# Patient Record
Sex: FEMALE | Race: WHITE | Employment: FULL TIME | URBAN - METROPOLITAN AREA
[De-identification: names, ages, dates, MRNs, and addresses within clinical notes are randomized per-mention and may not be internally consistent; named-entity substitution may affect disease eponyms.]

---

## 2020-10-06 ENCOUNTER — HOSPITAL ENCOUNTER (OUTPATIENT)
Age: 30
Discharge: ANOTHER HOSPITAL | End: 2020-10-06
Payer: COMMERCIAL

## 2020-10-06 VITALS
HEART RATE: 90 BPM | TEMPERATURE: 98 F | SYSTOLIC BLOOD PRESSURE: 111 MMHG | WEIGHT: 187 LBS | DIASTOLIC BLOOD PRESSURE: 82 MMHG | BODY MASS INDEX: 31.92 KG/M2 | RESPIRATION RATE: 20 BRPM | HEIGHT: 64 IN | OXYGEN SATURATION: 99 %

## 2020-10-06 DIAGNOSIS — R51.9 ACUTE INTRACTABLE HEADACHE, UNSPECIFIED HEADACHE TYPE: Primary | ICD-10-CM

## 2020-10-06 PROCEDURE — 99202 OFFICE O/P NEW SF 15 MIN: CPT | Performed by: NURSE PRACTITIONER

## 2020-10-06 RX ORDER — ALPRAZOLAM 0.25 MG/1
TABLET ORAL
COMMUNITY
Start: 2020-01-29

## 2020-10-06 NOTE — ED PROVIDER NOTES
Patient presents with:  Headache      HPI:     Laureano Washburn is a 35year old female presents with a chief complaint of a headache that started 3 days ago.   The patient states she does get headaches during her menstrual cycle, but this is not during he file        Gets together: Not on file        Attends Yarsanism service: Not on file        Active member of club or organization: Not on file        Attends meetings of clubs or organizations: Not on file        Relationship status: Not on file      Intim eye, and no history of migraine headaches in the past, I will send her to the emergency department for further evaluation. She agrees with the plan of care. Her family friend will drive her to the Emergency Department at [facility].     I discussed [facility]

## 2020-10-06 NOTE — ED INITIAL ASSESSMENT (HPI)
Per pt having headache/ migraine for 3 days reports right eye blurred vision. Denies any nausea or vomiting.  Minimal relief with over the counter medications

## 2020-10-06 NOTE — ED NOTES
Per provider recommendation pt will go to [facility] for further evaluation of symptoms.  Pt leaving IC stable no acute distress noted

## 2020-11-23 ENCOUNTER — HOSPITAL ENCOUNTER (OUTPATIENT)
Age: 30
Discharge: HOME | End: 2020-11-23
Payer: COMMERCIAL

## 2020-11-23 VITALS
DIASTOLIC BLOOD PRESSURE: 70 MMHG | HEART RATE: 94 BPM | OXYGEN SATURATION: 99 % | TEMPERATURE: 98 F | RESPIRATION RATE: 20 BRPM | SYSTOLIC BLOOD PRESSURE: 117 MMHG

## 2020-11-23 DIAGNOSIS — Z20.822 ENCOUNTER FOR SCREENING LABORATORY TESTING FOR COVID-19 VIRUS IN ASYMPTOMATIC PATIENT: Primary | ICD-10-CM

## 2020-11-23 PROCEDURE — 99213 OFFICE O/P EST LOW 20 MIN: CPT | Performed by: NURSE PRACTITIONER

## 2020-11-23 NOTE — ED PROVIDER NOTES
Patient Seen in: [facility]      History   Patient presents with:  Testing    Stated Complaint: COVID TESTING    HPI    This is a 28-year-old female presenting for Covid testing, asymptomatic.   Patient with no complaints, exposed by previous 2 seconds. Neurological:      General: No focal deficit present. Mental Status: She is alert and oriented to person, place, and time.    Psychiatric:         Mood and Affect: Mood normal.             ED Course     Labs Reviewed   2019 CHELSY [address]

## 2021-06-04 ENCOUNTER — APPOINTMENT (OUTPATIENT)
Dept: GENERAL RADIOLOGY | Age: 31
End: 2021-06-04
Attending: NURSE PRACTITIONER
Payer: COMMERCIAL

## 2021-06-04 ENCOUNTER — HOSPITAL ENCOUNTER (OUTPATIENT)
Age: 31
Discharge: HOME | End: 2021-06-04
Payer: COMMERCIAL

## 2021-06-04 VITALS
TEMPERATURE: 98 F | RESPIRATION RATE: 21 BRPM | OXYGEN SATURATION: 100 % | HEART RATE: 80 BPM | SYSTOLIC BLOOD PRESSURE: 121 MMHG | DIASTOLIC BLOOD PRESSURE: 63 MMHG

## 2021-06-04 DIAGNOSIS — K59.00 CONSTIPATION, UNSPECIFIED CONSTIPATION TYPE: Primary | ICD-10-CM

## 2021-06-04 PROCEDURE — 99214 OFFICE O/P EST MOD 30 MIN: CPT | Performed by: NURSE PRACTITIONER

## 2021-06-04 PROCEDURE — 81025 URINE PREGNANCY TEST: CPT | Performed by: NURSE PRACTITIONER

## 2021-06-04 PROCEDURE — 74018 RADEX ABDOMEN 1 VIEW: CPT | Performed by: NURSE PRACTITIONER

## 2021-06-04 NOTE — ED INITIAL ASSESSMENT (HPI)
Pt here for c/o being constipated. Pt states \"I haven't pooped in 2 weeks. \" Pt had tried Miralax and laxative and has only had a very small bowel movement the size of a marcia kiss. Pt has slight nausea and denies vomiting.  Pt has low abdominal and back

## 2021-06-05 NOTE — ED PROVIDER NOTES
Patient Seen in: [facility]      History   Patient presents with:  Constipation    Stated Complaint:     HPI/Subjective:   HPI    This well-appearing 67-year-old female who presents with a chief complaint of constipation.   Patient states she canal and external ear normal.      Left Ear: Tympanic membrane, ear canal and external ear normal.      Nose: Nose normal.      Mouth/Throat:      Mouth: Mucous membranes are moist.      Pharynx: Oropharynx is clear. Uvula midline.    Eyes:      General: L movement. I have given GI follow-up. I do not believe any emergent imaging or evaluation would be warranted at this time. Patient not tachycardic, not afebrile, not vomiting. Both patient and mom agreed with the plan of care and states understanding.  D

## 2021-07-29 NOTE — H&P
[address] [facility] [address] Gastroenterology                                                                                                  Clinic History and Physical     Pa used    Alcohol use: Never    Drug use: Not on file       Medications (Active prior to today's visit):  Current Outpatient Medications   Medication Sig Dispense Refill   • DULoxetine 30 MG Oral Cap DR Particles Take 30 mg by mouth daily.      • EPINEPHrine tenderness  Skin: dry, warm, no jaundice  Ext: no cyanosis, clubbing or edema is evident.    Neuro: Alert and oriented x4, and patient is having movements of all 4 extremities   Psych: Pt has a normal mood and affect, behavior is normal    Nursing note and

## 2021-08-12 ENCOUNTER — OFFICE VISIT (OUTPATIENT)
Dept: GASTROENTEROLOGY | Facility: CLINIC | Age: 31
End: 2021-08-12
Payer: COMMERCIAL

## 2021-08-12 VITALS
SYSTOLIC BLOOD PRESSURE: 126 MMHG | BODY MASS INDEX: 33.63 KG/M2 | HEIGHT: 64 IN | WEIGHT: 197 LBS | HEART RATE: 91 BPM | DIASTOLIC BLOOD PRESSURE: 83 MMHG

## 2021-08-12 DIAGNOSIS — K59.00 CONSTIPATION, UNSPECIFIED CONSTIPATION TYPE: Primary | ICD-10-CM

## 2021-08-12 PROCEDURE — 3008F BODY MASS INDEX DOCD: CPT | Performed by: NURSE PRACTITIONER

## 2021-08-12 PROCEDURE — 3079F DIAST BP 80-89 MM HG: CPT | Performed by: NURSE PRACTITIONER

## 2021-08-12 PROCEDURE — 3074F SYST BP LT 130 MM HG: CPT | Performed by: NURSE PRACTITIONER

## 2021-08-12 PROCEDURE — 99203 OFFICE O/P NEW LOW 30 MIN: CPT | Performed by: NURSE PRACTITIONER

## 2021-08-12 RX ORDER — DULOXETIN HYDROCHLORIDE 30 MG/1
30 CAPSULE, DELAYED RELEASE ORAL DAILY
COMMUNITY
Start: 2020-05-05

## 2021-08-12 RX ORDER — POLYETHYLENE GLYCOL 3350, SODIUM CHLORIDE, SODIUM BICARBONATE, POTASSIUM CHLORIDE 420; 11.2; 5.72; 1.48 G/4L; G/4L; G/4L; G/4L
POWDER, FOR SOLUTION ORAL
Qty: 4000 ML | Refills: 0 | Status: SHIPPED | OUTPATIENT
Start: 2021-08-12

## 2021-08-12 RX ORDER — EPINEPHRINE 0.3 MG/.3ML
INJECTION SUBCUTANEOUS
COMMUNITY

## 2021-08-12 RX ORDER — NORGESTIMATE AND ETHINYL ESTRADIOL 0.25-0.035
1 KIT ORAL DAILY
COMMUNITY
Start: 2020-05-06

## 2021-08-12 NOTE — PATIENT INSTRUCTIONS
I discussed with the patient that based on his/her clinical symptoms, exam and/or imaging, there is evidence of significant stool burden.  While laxative treatment will be helpful as a maintenance therapy, clearing the stool burden may help alleviate the sy

## 2021-12-06 ENCOUNTER — HOSPITAL ENCOUNTER (OUTPATIENT)
Age: 31
Discharge: HOME | End: 2021-12-06
Payer: COMMERCIAL

## 2021-12-06 VITALS
TEMPERATURE: 98 F | HEART RATE: 74 BPM | RESPIRATION RATE: 18 BRPM | DIASTOLIC BLOOD PRESSURE: 67 MMHG | OXYGEN SATURATION: 100 % | SYSTOLIC BLOOD PRESSURE: 122 MMHG

## 2021-12-06 DIAGNOSIS — Z11.52 ENCOUNTER FOR SCREENING FOR COVID-19: Primary | ICD-10-CM

## 2021-12-06 DIAGNOSIS — Z20.822 LAB TEST NEGATIVE FOR COVID-19 VIRUS: ICD-10-CM

## 2021-12-06 PROCEDURE — U0002 COVID-19 LAB TEST NON-CDC: HCPCS | Performed by: NURSE PRACTITIONER

## 2021-12-06 PROCEDURE — 99212 OFFICE O/P EST SF 10 MIN: CPT | Performed by: NURSE PRACTITIONER

## 2021-12-07 NOTE — ED PROVIDER NOTES
Patient Seen in: [facility]      History   Patient presents with:  Testing    Stated Complaint: Covid Test    Subjective:   Well-appearing 27-year-old female presents for COVID-19 testing for traveling. Patient denies any symptoms.   Patient abnormality. Memory normal.        ED Course     Labs Reviewed   RAPID SARS-COV-2 BY PCR - Normal                MDM   Patient is well-appearing. No acute distress. Rapid COVID-19 PCR not detected.   I discussed close PMD follow-up as well as return/ED pr

## 2022-05-02 ENCOUNTER — OFFICE VISIT (OUTPATIENT)
Dept: FAMILY MEDICINE CLINIC | Facility: CLINIC | Age: 32
End: 2022-05-02
Payer: COMMERCIAL

## 2022-05-02 VITALS
RESPIRATION RATE: 19 BRPM | WEIGHT: 204 LBS | DIASTOLIC BLOOD PRESSURE: 86 MMHG | BODY MASS INDEX: 34.83 KG/M2 | HEIGHT: 64 IN | SYSTOLIC BLOOD PRESSURE: 137 MMHG | OXYGEN SATURATION: 98 % | HEART RATE: 76 BPM

## 2022-05-02 DIAGNOSIS — F41.9 ANXIETY: ICD-10-CM

## 2022-05-02 DIAGNOSIS — K59.09 CHRONIC CONSTIPATION: Primary | ICD-10-CM

## 2022-05-02 DIAGNOSIS — R53.83 FATIGUE, UNSPECIFIED TYPE: ICD-10-CM

## 2022-05-02 DIAGNOSIS — R68.89 COLD INTOLERANCE: ICD-10-CM

## 2022-05-02 LAB
BASOPHILS # BLD AUTO: 0.03 X10(3) UL (ref 0–0.2)
BASOPHILS NFR BLD AUTO: 0.4 %
DEPRECATED RDW RBC AUTO: 40.4 FL (ref 35.1–46.3)
EOSINOPHIL # BLD AUTO: 0.23 X10(3) UL (ref 0–0.7)
EOSINOPHIL NFR BLD AUTO: 3.3 %
ERYTHROCYTE [DISTWIDTH] IN BLOOD BY AUTOMATED COUNT: 12.1 % (ref 11–15)
HCT VFR BLD AUTO: 38.8 %
HGB BLD-MCNC: 12.8 G/DL
IMM GRANULOCYTES # BLD AUTO: 0.01 X10(3) UL (ref 0–1)
IMM GRANULOCYTES NFR BLD: 0.1 %
LYMPHOCYTES # BLD AUTO: 2.84 X10(3) UL (ref 1–4)
LYMPHOCYTES NFR BLD AUTO: 40.6 %
MCH RBC QN AUTO: 30.3 PG (ref 26–34)
MCHC RBC AUTO-ENTMCNC: 33 G/DL (ref 31–37)
MCV RBC AUTO: 91.9 FL
MONOCYTES # BLD AUTO: 0.67 X10(3) UL (ref 0.1–1)
MONOCYTES NFR BLD AUTO: 9.6 %
NEUTROPHILS # BLD AUTO: 3.22 X10 (3) UL (ref 1.5–7.7)
NEUTROPHILS # BLD AUTO: 3.22 X10(3) UL (ref 1.5–7.7)
NEUTROPHILS NFR BLD AUTO: 46 %
PLATELET # BLD AUTO: 283 10(3)UL (ref 150–450)
RBC # BLD AUTO: 4.22 X10(6)UL
TSI SER-ACNC: 1.76 MIU/ML (ref 0.36–3.74)
WBC # BLD AUTO: 7 X10(3) UL (ref 4–11)

## 2022-05-02 PROCEDURE — 99203 OFFICE O/P NEW LOW 30 MIN: CPT | Performed by: FAMILY MEDICINE

## 2022-05-02 PROCEDURE — 3075F SYST BP GE 130 - 139MM HG: CPT | Performed by: FAMILY MEDICINE

## 2022-05-02 PROCEDURE — 3079F DIAST BP 80-89 MM HG: CPT | Performed by: FAMILY MEDICINE

## 2022-05-02 PROCEDURE — 3008F BODY MASS INDEX DOCD: CPT | Performed by: FAMILY MEDICINE

## 2022-05-09 ENCOUNTER — TELEPHONE (OUTPATIENT)
Dept: FAMILY MEDICINE CLINIC | Facility: CLINIC | Age: 32
End: 2022-05-09

## 2022-05-09 NOTE — TELEPHONE ENCOUNTER
Patients boyfriend came into the office, we were able to get a sooner appt. With [facility] provider Sing on 5/17/22, but patient is in chronic pain due to constipation. Would like a call from nurse or Dr. Krystal Mar to see if there is something to help her or can prescribe until she can see the gastro doctor next week.   Pls call at     pls advise

## 2022-05-09 NOTE — TELEPHONE ENCOUNTER
Upon chart review the patient recently saw Dr. Cyn Lopez regarding her constipation. Future Appointments   Date Time Provider Tamara Lata   5/17/2022 10:00 AM Michael Hayes MD [address] [facility]     Please advise.

## 2022-05-09 NOTE — TELEPHONE ENCOUNTER
5/17 is soonest available with all  GI providers. Patient sent [facility] message stating she is satisfied with that appointment.

## 2022-05-17 ENCOUNTER — TELEPHONE (OUTPATIENT)
Dept: GASTROENTEROLOGY | Facility: CLINIC | Age: 32
End: 2022-05-17

## 2022-05-17 ENCOUNTER — OFFICE VISIT (OUTPATIENT)
Dept: GASTROENTEROLOGY | Facility: CLINIC | Age: 32
End: 2022-05-17
Payer: COMMERCIAL

## 2022-05-17 VITALS
BODY MASS INDEX: 34.66 KG/M2 | WEIGHT: 203 LBS | HEIGHT: 64 IN | SYSTOLIC BLOOD PRESSURE: 112 MMHG | HEART RATE: 89 BPM | DIASTOLIC BLOOD PRESSURE: 74 MMHG

## 2022-05-17 DIAGNOSIS — R14.0 ABDOMINAL BLOATING: ICD-10-CM

## 2022-05-17 DIAGNOSIS — K59.00 CONSTIPATION, UNSPECIFIED CONSTIPATION TYPE: ICD-10-CM

## 2022-05-17 DIAGNOSIS — R19.4 FREQUENT BOWEL MOVEMENTS: Primary | ICD-10-CM

## 2022-05-17 DIAGNOSIS — R19.4 CHANGE IN BOWEL HABITS: Primary | ICD-10-CM

## 2022-05-17 PROCEDURE — 3074F SYST BP LT 130 MM HG: CPT | Performed by: INTERNAL MEDICINE

## 2022-05-17 PROCEDURE — 3008F BODY MASS INDEX DOCD: CPT | Performed by: INTERNAL MEDICINE

## 2022-05-17 PROCEDURE — 99214 OFFICE O/P EST MOD 30 MIN: CPT | Performed by: INTERNAL MEDICINE

## 2022-05-17 PROCEDURE — 3078F DIAST BP <80 MM HG: CPT | Performed by: INTERNAL MEDICINE

## 2022-05-17 RX ORDER — MAGNESIUM CARB/ALUMINUM HYDROX 105-160MG
296 TABLET,CHEWABLE ORAL ONCE
Status: SHIPPED | OUTPATIENT
Start: 2022-05-17

## 2022-05-17 RX ORDER — NORGESTIMATE AND ETHINYL ESTRADIOL 0.25-0.035
KIT ORAL
COMMUNITY

## 2022-05-17 RX ORDER — LINACLOTIDE 145 UG/1
145 CAPSULE, GELATIN COATED ORAL DAILY
Qty: 90 CAPSULE | Refills: 2 | Status: SHIPPED | OUTPATIENT
Start: 2022-05-17 | End: 2022-06-16

## 2022-05-17 NOTE — PATIENT INSTRUCTIONS
Instructions for constipation:    - take magnesium citrate solution when able    - the next day, start linzess 145 mcg daily   - if not effective enough, increase to 2 tabs daily    Instructions for the colonoscopy:  1. Schedule colonoscopy with anesthesia [Diagnosis: change in bowel habits, constipation]    2.  bowel prep from pharmacy (split dose golytely)    3. Continue all medications for procedure    4. Read all bowel prep instructions carefully    5. AVOID seeds, nuts, popcorn, raw fruits and vegetables (cooked is okay) for 2-3 days before procedure    6. You will need to go for COVID testing 72 hours before procedure. The testing team will call you a few days before your procedure to notify you where/when you can get COVID testing. If you do not go for COVID testing, the procedure cannot be performed. 7. If you start any NEW medication after your visit today, please notify us. Certain medications will need to be held before the procedure, or the procedure cannot be performed.

## 2022-05-17 NOTE — TELEPHONE ENCOUNTER
Scheduled for: Colonoscopy [id number]   Provider Name: Dr Liz Conteh  Date: Wed 5/25/2022    Location: [facility]  Sedation: MAC  Time: 10:15 am, (pt is aware that [facility] will call the day before to confirm arrival time)  Prep: split golytely    Meds/Allergies Reconciled?: reviewed by provider   Diagnosis with codes: change in bowel habits R19.4, constipation K59.00  Was patient informed to call insurance with codes (Y/N):  Yes  Referral sent?: Yes   [facility] or [id number] notified?: Electronic case request was sent to [facility] via [employer]. Medication Orders: Pt is aware to NOT take iron pills, herbal meds and diet supplements for 7 days before exam. Also to NOT take any form of alcohol, recreational drugs and any forms of ED meds 24 hours before exam.      Misc Orders: Patient was informed that they will need a COVID 19 test prior to their procedure. Patient verbally understood & will await a phone call from [facility] to schedule. Further instructions given by staff:   Instructions given and pt verbalized understanding

## 2022-05-18 ENCOUNTER — TELEPHONE (OUTPATIENT)
Dept: GASTROENTEROLOGY | Facility: CLINIC | Age: 32
End: 2022-05-18

## 2022-05-18 ENCOUNTER — PATIENT MESSAGE (OUTPATIENT)
Dept: FAMILY MEDICINE CLINIC | Facility: CLINIC | Age: 32
End: 2022-05-18

## 2022-05-18 NOTE — TELEPHONE ENCOUNTER
From: Angelica Beavers  To: Ryne Callejas MD  Sent: 5/18/2022 2:01 AM CDT  Subject: Covid     Hello,     I took an at home test and was positive for Covid. My throat is very sore. Is there anything I can take for this?      Thank you

## 2022-05-19 NOTE — TELEPHONE ENCOUNTER
Spoke to patient who has decided not to cancel procedure wasn't feeling well due to Allergies  Told patient I would notate chart   Emiliana Patrick

## 2022-05-23 ENCOUNTER — TELEPHONE (OUTPATIENT)
Dept: GASTROENTEROLOGY | Facility: CLINIC | Age: 32
End: 2022-05-23

## 2022-05-23 RX ORDER — POLYETHYLENE GLYCOL 3350, SODIUM CHLORIDE, SODIUM BICARBONATE, POTASSIUM CHLORIDE 420; 11.2; 5.72; 1.48 G/4L; G/4L; G/4L; G/4L
POWDER, FOR SOLUTION ORAL
Qty: 4000 ML | Refills: 0 | Status: SHIPPED | OUTPATIENT
Start: 2022-05-23

## 2022-05-23 NOTE — TELEPHONE ENCOUNTER
Changed orders to amb which released successfully to pharmacy. Provided [facility] with prep rx update.

## 2022-05-25 ENCOUNTER — SURGERY CENTER DOCUMENTATION (OUTPATIENT)
Dept: SURGERY | Age: 32
End: 2022-05-25

## 2022-05-25 PROCEDURE — 45380 COLONOSCOPY AND BIOPSY: CPT | Performed by: INTERNAL MEDICINE

## 2022-05-27 ENCOUNTER — TELEPHONE (OUTPATIENT)
Dept: GASTROENTEROLOGY | Facility: CLINIC | Age: 32
End: 2022-05-27

## 2022-05-27 NOTE — TELEPHONE ENCOUNTER
Dr. Sandra Encinas--    Attached colonoscopy results performed 05/25/2022 below. Please advise on pathology findings and recommendations. Thank you! Sent [facility] pathology report to scan.

## 2022-06-03 NOTE — TELEPHONE ENCOUNTER
Dr. Blas Garcia--    Patient sent mychart (attached below) following up on procedure results. Please see pathology attach below and advise on results / further recommendations.      Thank you

## 2022-06-07 ENCOUNTER — TELEPHONE (OUTPATIENT)
Dept: GASTROENTEROLOGY | Facility: CLINIC | Age: 32
End: 2022-06-07

## 2022-06-07 NOTE — TELEPHONE ENCOUNTER
Entered into Epic. Recall CLN in 3 years per Dr. Lamonte Davis. Last CLN done 05/25/2022. Recall entered into Patient Outreach for 05/25/2025. Health Maintenance updated.

## 2022-06-07 NOTE — TELEPHONE ENCOUNTER
Called to go over path that shows small SSAs in the left colon that were removed. Recommend repeat colonoscopy in 3-5 years. Pt just increased linzess to 290 mcg daily yesterday so it is too early to tell if it will work well. If it doesn't I asked her to follow up with me so we can discuss next steps.       GI staff: please place recall for colonoscopy in 3 years     Hosea Patiño MD  [facility]

## 2022-06-30 ENCOUNTER — OFFICE VISIT (OUTPATIENT)
Dept: OBGYN CLINIC | Facility: CLINIC | Age: 32
End: 2022-06-30
Payer: COMMERCIAL

## 2022-06-30 VITALS
HEIGHT: 64 IN | WEIGHT: 204 LBS | DIASTOLIC BLOOD PRESSURE: 78 MMHG | BODY MASS INDEX: 34.83 KG/M2 | SYSTOLIC BLOOD PRESSURE: 112 MMHG

## 2022-06-30 DIAGNOSIS — Z30.09 FAMILY PLANNING: ICD-10-CM

## 2022-06-30 DIAGNOSIS — Z01.419 ENCOUNTER FOR GYNECOLOGICAL EXAMINATION WITHOUT ABNORMAL FINDING: Primary | ICD-10-CM

## 2022-06-30 PROCEDURE — 87624 HPV HI-RISK TYP POOLED RSLT: CPT | Performed by: OBSTETRICS & GYNECOLOGY

## 2022-06-30 RX ORDER — MISOPROSTOL 200 UG/1
TABLET ORAL
Qty: 2 TABLET | Refills: 0 | Status: SHIPPED | OUTPATIENT
Start: 2022-06-30

## 2022-06-30 RX ORDER — ALPRAZOLAM 0.25 MG/1
TABLET ORAL
COMMUNITY
Start: 2016-03-01

## 2022-07-01 LAB — HPV I/H RISK 1 DNA SPEC QL NAA+PROBE: NEGATIVE

## 2022-07-07 ENCOUNTER — PATIENT MESSAGE (OUTPATIENT)
Dept: OBGYN CLINIC | Facility: CLINIC | Age: 32
End: 2022-07-07

## 2022-07-12 ENCOUNTER — OFFICE VISIT (OUTPATIENT)
Dept: OBGYN CLINIC | Facility: CLINIC | Age: 32
End: 2022-07-12
Payer: COMMERCIAL

## 2022-07-12 VITALS
SYSTOLIC BLOOD PRESSURE: 116 MMHG | HEIGHT: 64 IN | WEIGHT: 205.13 LBS | BODY MASS INDEX: 35.02 KG/M2 | DIASTOLIC BLOOD PRESSURE: 74 MMHG

## 2022-07-12 DIAGNOSIS — Z30.430 ENCOUNTER FOR INSERTION OF INTRAUTERINE CONTRACEPTIVE DEVICE: ICD-10-CM

## 2022-07-12 DIAGNOSIS — Z30.430 ENCOUNTER FOR INSERTION OF INTRAUTERINE CONTRACEPTIVE DEVICE (IUD): Primary | ICD-10-CM

## 2022-07-12 LAB
CONTROL LINE PRESENT WITH A CLEAR BACKGROUND (YES/NO): YES YES/NO
PREGNANCY TEST, URINE: NEGATIVE

## 2022-07-12 PROCEDURE — 58300 INSERT INTRAUTERINE DEVICE: CPT | Performed by: OBSTETRICS & GYNECOLOGY

## 2022-07-12 PROCEDURE — 3008F BODY MASS INDEX DOCD: CPT | Performed by: OBSTETRICS & GYNECOLOGY

## 2022-07-12 PROCEDURE — 3074F SYST BP LT 130 MM HG: CPT | Performed by: OBSTETRICS & GYNECOLOGY

## 2022-07-12 PROCEDURE — 3078F DIAST BP <80 MM HG: CPT | Performed by: OBSTETRICS & GYNECOLOGY

## 2022-07-12 PROCEDURE — 81025 URINE PREGNANCY TEST: CPT | Performed by: OBSTETRICS & GYNECOLOGY

## 2022-07-12 NOTE — PROGRESS NOTES
IUD Insertion     Pregnancy Results: UCG negative  Birth control method(s) used: OCP  LMP:     Consent signed. Procedure discussed with the patient in detail including indication, risks, benefits, alternatives and complications. I discussed with the patient the procedure. I discussed the normal SE of break through vaginal bleeding which could last up to 90 days as well as some short term lower abdominal cramping. I discussed with her to call me if she noticed worsening lower abdominal pain, fever, chills, nausea, vomiting, or a foul vaginal discharge. I also discussed with her how to feel for the IUD strings in her vagina to insure that the IUD had not been expelled. Procedure:  Speculum placed in the vagina. Betadine wash of vagina and cervix. Single tooth tenaculum was placed at the 12 o'clock position. Uterus sounded to 6 cm. Karissa [place] IUD was placed without difficulty. Strings cut at 3 cm from the os. Single tooth tenaculum removed. Good hemostasis noted. Patient tolerated procedure well. Visit Plan:  IUD surveillance was discussed with the patient as above. Written info from the IUD packaging also was given to the patient at the completion of the procedure.

## 2022-09-19 ENCOUNTER — OFFICE VISIT (OUTPATIENT)
Dept: DERMATOLOGY CLINIC | Facility: CLINIC | Age: 32
End: 2022-09-19
Payer: COMMERCIAL

## 2022-09-19 DIAGNOSIS — D22.9 BENIGN NEVUS: Primary | ICD-10-CM

## 2022-09-19 DIAGNOSIS — L82.0 SEBORRHEIC KERATOSIS, INFLAMED: ICD-10-CM

## 2023-01-23 ENCOUNTER — OFFICE VISIT (OUTPATIENT)
Dept: DERMATOLOGY CLINIC | Facility: CLINIC | Age: 33
End: 2023-01-23

## 2023-01-23 DIAGNOSIS — D22.9 MULTIPLE BENIGN NEVI: ICD-10-CM

## 2023-01-23 DIAGNOSIS — D18.01 CHERRY ANGIOMA: ICD-10-CM

## 2023-01-23 DIAGNOSIS — L81.4 LENTIGINES: Primary | ICD-10-CM

## 2023-02-02 ENCOUNTER — PATIENT MESSAGE (OUTPATIENT)
Dept: DERMATOLOGY CLINIC | Facility: CLINIC | Age: 33
End: 2023-02-02

## 2023-03-06 ENCOUNTER — TELEPHONE (OUTPATIENT)
Dept: DERMATOLOGY CLINIC | Facility: CLINIC | Age: 33
End: 2023-03-06

## 2024-10-15 ENCOUNTER — OFFICE VISIT (OUTPATIENT)
Dept: OBGYN CLINIC | Facility: CLINIC | Age: 34
End: 2024-10-15
Payer: COMMERCIAL

## 2024-10-15 VITALS
DIASTOLIC BLOOD PRESSURE: 70 MMHG | HEIGHT: 64 IN | BODY MASS INDEX: 33.63 KG/M2 | WEIGHT: 197 LBS | SYSTOLIC BLOOD PRESSURE: 122 MMHG

## 2024-10-15 DIAGNOSIS — Z01.419 ENCOUNTER FOR GYNECOLOGICAL EXAMINATION WITHOUT ABNORMAL FINDING: Primary | ICD-10-CM

## 2024-10-15 DIAGNOSIS — Z30.431 IUD CHECK UP: ICD-10-CM

## 2024-10-15 PROCEDURE — 99395 PREV VISIT EST AGE 18-39: CPT | Performed by: OBSTETRICS & GYNECOLOGY

## 2024-10-15 RX ORDER — LEVONORGESTREL 19.5 MG/1
1 INTRAUTERINE DEVICE INTRAUTERINE ONCE
COMMUNITY

## 2024-10-15 NOTE — PROGRESS NOTES
GYN H&P     10/15/2024  6:31 PM      HPI: Patient is a 34 year old  for IUD check. Has Kyleena IUD in.     Menses: has only spotting with IUD.   No co/o pain.     Last month had bilateral breast pain and nipple sensisitivity which went away.       Patient's last menstrual period was 10/04/2024.    OB History    Para Term  AB Living   0 0 0 0 0 0   SAB IAB Ectopic Multiple Live Births   0 0 0 0 0       GYN hx:    Hx Prior Abnormal Pap: No  Pap Date: 22  Pap Result Notes: wnl        Past Medical History:    Anxiety    PCOS (polycystic ovarian syndrome)     Past Surgical History:   Procedure Laterality Date    Colonoscopy      Tonsillectomy       Allergies[1]  Family History   Problem Relation Age of Onset    High Blood Pressure Father     Cancer Mother 60        Thyroid Cancer    Ovarian Cancer Maternal Aunt 45    Melanoma Maternal Uncle     Breast Cancer Neg     Colon Cancer Neg      Social History     Socioeconomic History    Marital status: Single   Occupational History    Occupation: Sales     Comment: [employer]   Tobacco Use    Smoking status: Never    Smokeless tobacco: Never   Vaping Use    Vaping status: Never Used   Substance and Sexual Activity    Alcohol use: Yes     Comment: Socially     Drug use: Yes     Types: Cannabis     Comment: Socially     Sexual activity: Yes     Partners: Male     Birth control/protection: Pill     Social History     Social History Narrative    Live with BF    Feels safe.        Medications reviewed. See active list.     /70   Ht 64\"   Wt 197 lb (89.4 kg)   LMP 10/04/2024   BMI 33.81 kg/m²       Exam:   GENERAL: well developed, well nourished, in no apparent distress  SKIN: no rashes, no suspicious lesions  HEENT: normal  NECK: supple; no thyroidmegaly, no adenopathy  BREASTS: symmetrical, nontender, no palpable masses or nodes, no nipple discharge, no skin changes, no dippling, no palpable axillary adenopathy  ABDOMEN: Soft, non  distended; non tender, no masses.  Liver and spleen non-tender, no enlargement. No palpable hernias  GYNE/:  External Genitalia: Normal appearing, no lesions, normal hair distribution   Urethral meatus appear wnl, no abnormal discharge or lesions noted.   Bladder: well supported, urethra wnl, no palpable tenderness or masses, no discharge  Vagina: normal pink mucosa, no lesions, normal clear discharge.   Uterus: midline, mobile, non-tender, firm and smooth  Cervix: pink, no lesions grossly visible, no discharge, IUD string visible.   Adnexa: non tender, no palpable masses, normal size  Anus:  No lesions or visible hemorrhoids        A/P: Patient is 34 year old female     1. Encounter for gynecological examination without abnormal finding    2. IUD check up    DW pt IUD is in place and could easily be removed. DW her that fertility returns soon after IUD removal.     Cheyanne Zhou MD              [1]   Allergies  Allergen Reactions    Penicillins SWELLING and ANGIOEDEMA    Shellfish-Derived Products OTHER (SEE COMMENTS)

## 2024-10-23 ENCOUNTER — HOSPITAL ENCOUNTER (OUTPATIENT)
Age: 34
Discharge: HOME | End: 2024-10-23
Payer: COMMERCIAL

## 2024-10-23 VITALS
TEMPERATURE: 97 F | OXYGEN SATURATION: 99 % | SYSTOLIC BLOOD PRESSURE: 111 MMHG | DIASTOLIC BLOOD PRESSURE: 65 MMHG | HEART RATE: 99 BPM | RESPIRATION RATE: 20 BRPM

## 2024-10-23 DIAGNOSIS — R11.2 NAUSEA AND VOMITING IN ADULT: Primary | ICD-10-CM

## 2024-10-23 LAB
B-HCG UR QL: NEGATIVE
COLOR UR: YELLOW
GLUCOSE UR STRIP-MCNC: NEGATIVE MG/DL
KETONES UR STRIP-MCNC: >=160 MG/DL
LEUKOCYTE ESTERASE UR QL STRIP: NEGATIVE
NITRITE UR QL STRIP: NEGATIVE
PH UR STRIP: 5.5 [PH]
PROT UR STRIP-MCNC: 30 MG/DL
SP GR UR STRIP: >=1.03
UROBILINOGEN UR STRIP-ACNC: <2 MG/DL

## 2024-10-23 PROCEDURE — 81002 URINALYSIS NONAUTO W/O SCOPE: CPT | Performed by: NURSE PRACTITIONER

## 2024-10-23 PROCEDURE — 99214 OFFICE O/P EST MOD 30 MIN: CPT | Performed by: NURSE PRACTITIONER

## 2024-10-23 PROCEDURE — 81025 URINE PREGNANCY TEST: CPT | Performed by: NURSE PRACTITIONER

## 2024-10-23 PROCEDURE — S0119 ONDANSETRON 4 MG: HCPCS | Performed by: NURSE PRACTITIONER

## 2024-10-23 RX ORDER — ONDANSETRON 4 MG/1
8 TABLET, ORALLY DISINTEGRATING ORAL ONCE
Status: COMPLETED | OUTPATIENT
Start: 2024-10-23 | End: 2024-10-23

## 2024-10-23 RX ORDER — ONDANSETRON 4 MG/1
4 TABLET, ORALLY DISINTEGRATING ORAL EVERY 4 HOURS PRN
Qty: 10 TABLET | Refills: 0 | Status: SHIPPED | OUTPATIENT
Start: 2024-10-23 | End: 2024-10-30

## 2024-10-23 NOTE — ED INITIAL ASSESSMENT (HPI)
Pt here with n/v since 0730 yesterday morning. Intermittent abdominal discomfort, headache and dizziness.

## 2024-10-23 NOTE — ED PROVIDER NOTES
Patient Seen in: [facility]      History     Chief Complaint   Patient presents with    Nausea/vomiting    Dizziness    Headache     Stated Complaint: vomiting    Subjective:   Well-appearing 34-year-old female with anxiety and polycystic ovarian syndrome presents with complaints of nausea and vomiting since 7:30 AM yesterday.  Patient communicates that she has attempted to drink fluids, but is unable to tolerate.  Patient communicates abdominal pain after vomiting but them resolves.  Patient communicates that she has been having intermittent headaches and dizziness since yesterday afternoon.  Patient denies any abdominal pain at present.  Patient denies dizziness at present.  Patient denies syncope.  Patient denies any measured temps at home.          Objective:     Past Medical History:    Anxiety    PCOS (polycystic ovarian syndrome)              Past Surgical History:   Procedure Laterality Date    Colonoscopy      Tonsillectomy                  Social History     Socioeconomic History    Marital status: Single   Occupational History    Occupation: [place]     Comment: [employer]   Tobacco Use    Smoking status: Never    Smokeless tobacco: Never   Vaping Use    Vaping status: Never Used   Substance and Sexual Activity    Alcohol use: Yes     Comment: Socially     Drug use: Yes     Types: Cannabis     Comment: Socially     Sexual activity: Yes     Partners: Male     Birth control/protection: Pill   Other Topics Concern    Blood Transfusions No    Reaction to local anesthetic No    Pt has a pacemaker No    Pt has a defibrillator No   Social History Narrative    Live with BF    Feels safe.      Social Drivers of Health      Received from [facility]    Social Connections    Received from [facility]    Housing Stability              Review of Systems    Positive for stated complaint: vomiting  Other systems are as noted in HPI.  Constitutional and vital signs  reviewed.      All other systems reviewed and negative except as noted above.    Physical Exam     ED Triage Vitals [10/23/24 0901]   /65   Pulse 112   Resp 20   Temp 97.3 °F (36.3 °C)   Temp src Temporal   SpO2 99 %   O2 Device None (Room air)       Current Vitals:   Vital Signs  BP: 111/65  Pulse: 99  Resp: 20  Temp: 97.3 °F (36.3 °C)  Temp src: Temporal    Oxygen Therapy  SpO2: 99 %  O2 Device: None (Room air)        Physical Exam  VS: Vital signs reviewed. 02 saturation within normal limits for this patient.    General: Patient is awake and alert, oriented to person, place and time. Pt appears non-toxic.     HEENT: Head is normocephalic, atraumatic. Nonicteric sclera, no conjunctival injection. No facial droop or slurred speech. No oral lesions or pallor. Mucous membranes moist. Left and right tympanic membranes normal.      Nose: No congestion or rhinorrhea.      Mouth/Throat:      Lips: Pink.      Mouth: Mucous membranes are moist.      Pharynx: Oropharynx is clear.     Neck: No cervical lymphadenopathy. Supple. Normal ROM.    Heart: Regular rate and rhythm, normal S1, normal S2.    Lungs: Clear to auscultation. Good inspiratory effort. + Airway entry bilaterally without wheezes, rhonchi or crackles. No accessory muscle use or tachypnea.    Abdomen: Soft, nontender, non-distended.    Extremities: No focal swelling or tenderness. Capillary refill noted.     Skin: Warm, dry and normal in color.     Psychiatric: Normal affect, judgement normal, insight normal.     CNS: Moves all 4 extremities. Interacts appropriately. No gait abnormality. Memory normal.         Mental Status: She is alert and oriented to person, place, and time.      Sensory: Sensation is intact.      Motor: Motor function is intact.      Coordination: Coordination is intact.      Gait: Gait is intact.     ED Course     Labs Reviewed   [facility] POCT URINALYSIS DIPSTICK - Abnormal; Notable for the following components:       Result Value    Urine  Clarity Cloudy (*)     Protein urine 30 (*)     Ketone, Urine >=160 (*)     Bilirubin, Urine Small (*)     Blood, Urine Trace-Intact (*)     All other components within normal limits   POCT PREGNANCY URINE - Normal        MDM   Medical Decision Making  Well-appearing. Heart rate improved 112 --> 99.  Patient tolerated Gatorade post 8 mg of Zofran ODT that was given in clinic, no vomiting.  Abdomen benign.  Neuroexam intact.  Urine UCG negative.  Ketones present in urine dip.  Prescription for Zofran 4 mg ODT was sent to pharmacy on file for nausea as needed.  I discussed avoiding greasy, spicy foods.  Work note was provided to patient.  Close PMD follow-up as well as return precautions discussed.    Problems Addressed:  Nausea and vomiting in adult: acute illness or injury    Amount and/or Complexity of Data Reviewed  Labs: ordered. Decision-making details documented in ED Course.    Risk  Prescription drug management.        Disposition and Plan     Clinical Impression:  1. Nausea and vomiting in adult         Disposition:  Discharge  10/23/2024 10:29 am    Follow-up:  Thomas Nelson MD  [address] [place] [address]  [phone]    In 1 week  As needed          Medications Prescribed:  Discharge Medication List as of 10/23/2024 10:41 AM        START taking these medications    Details   ondansetron 4 MG Oral Tablet Dispersible Take 1 tablet (4 mg total) by mouth every 4 (four) hours as needed for Nausea., Normal, Disp-10 tablet, R-0                 Supplementary Documentation:

## 2025-02-14 ENCOUNTER — TELEPHONE (OUTPATIENT)
Facility: CLINIC | Age: 35
End: 2025-02-14

## 2025-02-14 DIAGNOSIS — Z12.11 SCREEN FOR COLON CANCER: ICD-10-CM

## 2025-02-14 DIAGNOSIS — Z86.0101 HISTORY OF ADENOMATOUS POLYP OF COLON: Primary | ICD-10-CM

## 2025-02-14 NOTE — TELEPHONE ENCOUNTER
Called patient for scheduled telephone colon screen.   Please advise on colonoscopy and bowel prep orders.   Medications, pharmacy, and allergies reviewed.     Age 45-77 y/o:   › MD preference: Dr. Madrigal  › Insurance: [id number]   › Last PCP visit: 2022  › Last CBC: 2022  › H/W/BMI: 5'4\"/ 177 lbs/ 30.4    Special comments/notes:  Recall    Last Procedure, Date, MD:   2022, colon, Dr. Madrigal   Last diagnosis: See below   Recalled for (mth/yrs): 3 years   Sedation used previously: MAC   Last Prep Used: golytely   Quality of Prep: good     Telephone Colon Screening Questionnaire Yes No   Are you currently experiencing any GI symptoms [] [x]   If yes, explain:     Rectal bleeding [] [x]   Black stool [] [x]   Dysphagia or food \"feeling stuck\" when eating [] [x]   Intractable vomiting  Will have occasional vomiting every couple of months [] [x]   Unexplained weight loss [] [x]   First colonoscopy [] [x]   Family history of colon cancer [] [x]   Any issues with anesthesia [] [x]   If yes, explain:      Any recent complaints related to chest pain &/or shortness of breath [] [x]   Referred to a cardiologist?  [] [x]   If yes, explain:      History of  respiratory issues/oxygen/EDIL/COPD [] [x]   CPAP/BiPAP:     History of devices (pacemaker/defibrillator) [] [x]   History of heart attack &/or stroke [] [x]   If yes, in the last 12 months? Stent placement?  [] [x]     Medication Reconciliation  Yes  No   Anticoagulants (except Aspirin) [] [x]   Diabetic Medication [] [x]   Weight loss medication (phentermine/vyvanse/saxsenda/etc) [] [x]   Iron/herbal/multivitamin supplement(s)  B-12 [x] []   Usage of marijuana, CBD &/or vape product(s)  Edibles (occasionally) [x] []          Leann Madrigal MD   Physician  Specialty: GASTROENTEROLOGY     Procedures     Signed     Encounter Date: 2022     Signed         COLONOSCOPY REPORT           Danisha Wood      1990 Age 31 year old   PCP No primary  care provider on file. Endoscopist Leann Madrigal MD      Date of procedure: 05/25/22     Location: [facility] [place] ([facility])     Procedure: Colonoscopy with biopsy     Pre-operative diagnosis: Change in bowel habits     Post-operative diagnosis: colon polyps, internal hemorrhoids     Medications: MAC     Withdrawal time: 17 minutes     Procedure:  Informed consent was obtained from the patient after the risks of the procedure were discussed, including but not limited to bleeding, perforation, aspiration, infection, or possibility of a missed lesion. After discussions of the risks/benefits and alternatives to this procedure, as well as the planned sedation, the patient was placed in the left lateral decubitus position and begun on continuous blood pressure pulse oximetry and EKG monitoring and this was maintained throughout the procedure. Once an adequate level of sedation was obtained a digital rectal exam was completed. Then the lubricated tip of the [id number] diagnostic video colonoscope was inserted and advanced without difficulty to the cecum using the CO2 insufflation technique. The cecum was identified by localizing the trifold, the appendix and the ileocecal valve. Withdrawal was begun with thorough washing and careful examination of the colonic walls and folds. A routine second examination of the cecum/ascending colon was performed. Photodocumentation was obtained. The bowel prep was good. Views of the colon were good with washing. I then carefully withdrew the instrument from the patient who tolerated the procedure well.      Complications: none.     Findings:   1. Three polyps noted as follows:      A. There were three polyps at the rectosigmoid junction that measured 2 mm and were removed completely by cold forceps biopsies.        2. Diverticulosis: none.     3. Terminal ileum: the visualized mucosa appeared normal.     4. A retroflexed view of the rectum revealed small internal  hemorrhoids.     5. The colonic mucosa throughout the colon showed normal vascular pattern, without evidence of angioectasias or inflammation.      6. GERMAINE: normal rectal tone, no masses palpated.      Impression:   Three small colon polyps, resected and retrieved.  Small internal hemorrhoids.     Recommend:  Await pathology. The interval for the next colonoscopy will be determined after reviewing pathology. If new signs or symptoms develop, colonoscopy may need to be repeated sooner.   High fiber diet.  Monitor for blood in the stool. If having more than just tinge of blood, call office or go to the ER.  Start linzess 145 mcg tomorrow.  OK to increase to 2 pills daily if needed.  Follow up with Dr. Madrigal if symptoms do not improve.     >>>If tissue was obtained and you have not received your pathology results either by phone or letter within 2 weeks, please call our office at [phone].     Specimens: colon     Blood loss: <1 ml               Electronically signed by Leann Madrigal MD at 5/25/2022  9:48 AM

## 2025-02-17 ENCOUNTER — OFFICE VISIT (OUTPATIENT)
Dept: OTOLARYNGOLOGY | Facility: CLINIC | Age: 35
End: 2025-02-17
Payer: COMMERCIAL

## 2025-02-17 ENCOUNTER — OFFICE VISIT (OUTPATIENT)
Dept: AUDIOLOGY | Facility: CLINIC | Age: 35
End: 2025-02-17

## 2025-02-17 VITALS — HEIGHT: 64 IN | WEIGHT: 170 LBS | BODY MASS INDEX: 29.02 KG/M2

## 2025-02-17 DIAGNOSIS — H69.92 DYSFUNCTION OF LEFT EUSTACHIAN TUBE: Primary | ICD-10-CM

## 2025-02-17 DIAGNOSIS — H91.90 HEARING LOSS, UNSPECIFIED HEARING LOSS TYPE, UNSPECIFIED LATERALITY: ICD-10-CM

## 2025-02-17 DIAGNOSIS — R21 MALAR RASH: ICD-10-CM

## 2025-02-17 DIAGNOSIS — H93.292 ABNORMAL AUDITORY PERCEPTION OF LEFT EAR: Primary | ICD-10-CM

## 2025-02-17 RX ORDER — AZELASTINE 1 MG/ML
1 SPRAY, METERED NASAL 2 TIMES DAILY
Qty: 30 ML | Refills: 3 | Status: SHIPPED | OUTPATIENT
Start: 2025-02-17

## 2025-02-17 RX ORDER — VENLAFAXINE HYDROCHLORIDE 37.5 MG/1
1 CAPSULE, EXTENDED RELEASE ORAL DAILY
COMMUNITY
Start: 2023-06-27

## 2025-02-17 RX ORDER — PREDNISONE 20 MG/1
20 TABLET ORAL DAILY
Qty: 5 TABLET | Refills: 0 | Status: SHIPPED | OUTPATIENT
Start: 2025-02-17 | End: 2025-02-22

## 2025-02-17 RX ORDER — FLUTICASONE PROPIONATE 50 MCG
1 SPRAY, SUSPENSION (ML) NASAL 2 TIMES DAILY
Qty: 16 G | Refills: 3 | Status: SHIPPED | OUTPATIENT
Start: 2025-02-17

## 2025-02-18 NOTE — PROGRESS NOTES
[facility]  OTOLARYNGOLOGY - HEAD & NECK SURGERY    2/17/2025     Reason for Consultation:   Left ear problem    History of Present Illness:   Patient is a pleasant 34 year old female who is being seen for a recurring left ear problem which she finds has been coming on more frequently.  She describes this as a temporary loss of hearing in her left ear and a muffled sensation which can last hours.  She states that this can come and go even on the same day.  Has had more consistent episodes recently.  This only happens on the left side.  She states that when this happens she tries to clear her ear and pop it but is not able to get it to release.  Is not having any ear drainage but does have discomfort or pain at times especially when she is blowing her nose.  She has noticed that she has been having more drainage from her nose.  Has not had allergy testing but does have an appointment with Dr. Quiroz coming up.  She did try nasal decongestants but this caused her to have palpitations so she stopped.  Has not been on any other allergy regiment.  Has not had any previous nasal or sinus surgery.    Past Medical History  Past Medical History:    Anxiety    PCOS (polycystic ovarian syndrome)       Past Surgical History  Past Surgical History:   Procedure Laterality Date    Colonoscopy      Tonsillectomy         Family History  Family History   Problem Relation Age of Onset    High Blood Pressure Father     Cancer Mother 60        Thyroid Cancer    Ovarian Cancer Maternal Aunt 45    Melanoma Maternal Uncle     Breast Cancer Neg     Colon Cancer Neg        Social History  Pediatric History   Patient Parents    mrs jean-pierre (Mother)     Other Topics Concern     Service Not Asked    Blood Transfusions No    Caffeine Concern Not Asked    Occupational Exposure Not Asked    Hobby Hazards Not Asked    Sleep Concern Not Asked    Stress Concern Not Asked    Weight Concern Not Asked    Special Diet Not Asked    Back Care Not  Asked    Exercise Not Asked    Bike Helmet Not Asked    Seat Belt Not Asked    Self-Exams Not Asked    Grew up on a farm Not Asked    History of tanning Not Asked    Outdoor occupation Not Asked    Breast feeding Not Asked    Reaction to local anesthetic No    Pt has a pacemaker No    Pt has a defibrillator No   Social History Narrative    Live with BF    Feels safe.            Current Medications:  Current Outpatient Medications   Medication Sig Dispense Refill    venlafaxine ER 37.5 MG Oral Capsule SR 24 Hr Take 1 capsule (37.5 mg total) by mouth daily.      fluticasone propionate 50 MCG/ACT Nasal Suspension 1 spray by Nasal route 2 (two) times daily. 16 g 3    azelastine 0.1 % Nasal Solution 1 spray by Nasal route 2 (two) times daily. 30 mL 3    predniSONE 20 MG Oral Tab Take 1 tablet (20 mg total) by mouth daily for 5 days. 5 tablet 0    Cyanocobalamin (B-12 OR) Take 1 tablet by mouth daily.      levonorgestrel (KYLEENA) 19.5 MG Intrauterine IUD 19,500 mcg (1 each total) by Intrauterine route one time.      ALPRAZolam 0.25 MG Oral Tab       triamcinolone 0.1 % External Cream APPLY TO AREA 2X DAY      EPINEPHrine 0.3 MG/0.3ML Injection Solution Auto-injector EpiPen 2-Saul 0.3 mg/0.3 mL injection, auto-injector   Take 1 auto as needed by injection route for 90 days.         Allergies  Allergies[1]    Review of Systems:   A comprehensive 10 point review of systems was completed.  Pertinent positives and negatives noted in the the HPI.    Physical Exam:   Height 5' 4\" (1.626 m), weight 170 lb (77.1 kg), last menstrual period 10/04/2024, not currently breastfeeding.    GENERAL: No acute distress, Comfortable appearing  FACE: HB 1/6, Normal Animation  HEAD: Normocephalic  EYES: EOMI, pupils equil  EARS: Bilateral Auricles Symmetric  NOSE: Nares patent bilaterally  ORAL CAVITY: Tongue mobile, Oropharynx clear, Floor of mouth clear, Posterior oropharynx normal  NECK: No palpable lymphadenopathy, thyroid not palpable,  nontender    Canals:  Right: Clear  Left: Clear    Tympanic Membranes:  Right: Normal tympanic membrane, with no retraction, middle ear space clear  Left: Normal tympanic membrane. with no retraction middle ear space clear    TM Visualized Method:   Right TM examined via otomicroscopy.   Left TM examined via otomicroscopy.        Results:     Laboratory Data:  Lab Results   Component Value Date    WBC 7.0 05/02/2022    HGB 12.8 05/02/2022    HCT 38.8 05/02/2022    .0 05/02/2022    TSH 1.760 05/02/2022         Imaging:  No results found.    Latest Audiogram Result (Hz) Exam performed: 2/17/2025 3:40 PM Last edited by Nell Cook AUD on 2/17/2025 3:44 PM        125 250  1500 2000 3000 4000 6000 8000    Right air:  5 5  0  10  15  15    Left air:  5 0  0  5  10  15       Reliability:  Good    Transducer:  Headphones    Technique:  Conventional Audiometry    Comments:            Latest Speech Audiometry  Last edited by Nell Cook AUD on 2/17/2025 3:44 PM       Ear Method PTA SAT SRT [facility] Test/list Score (%) Intensity Mask/noise Notes    right live voice   10   10 By Difficulty 100 55      left live voice   5   10 By Difficulty 100 55                    Latest Tympanogram Result       Probe Tone (Hz): 226 Exam performed: 2/17/2025 3:41 PM Last edited by Nell Cook AUD on 2/17/2025 3:44 PM      Tympanograms  These were drawn by a user, not generated from device data      Right Ear Left Ear                     Right Ear Left Ear    Tympanogram type: Type A Type A    Canal volume (mL): 1.7 1.8    Peak pressure (daPa): -9 -10    Peak amplitude (mmho): 1.2 1.4    Tympanogram width (daPa):        Comments:                    Latest Audiogram and Tympanogram Result Text  Last edited by Nell Cook AUD on 2/17/2025  3:50 PM      Addendum      HISTORY:    Danisha Wood, 34 year old, was seen for a hearing assessment during an office visit with otolaryngologist  Bull Villagran DO due to complaint of  left hearing loss.   See ENT ROS intake form for detailed history.    RESULTS:    Tympanograms were consistent with normal pressure and compliance in both ears.       Pure tone air conduction thresholds were consistent with normal hearing sensitivity 250-8000 Hz, bilaterally.       SRT:  Right: 10 dB HL            Left :  5 dB HL    WRS:  Right: 100% at 55 dB HL             Left: 100% at 55 dB HL    RECOMMENDATIONS:  1.  Follow-up with Bull Villagran MD.          Addended by Nell Cook, RAJESH on 2/17/2025  3:50 PM   HISTORY:    Danisha Wood, 34 year old, was seen for a hearing assessment during an office visit with otolaryngologist Bull Villagran DO due to complaint of  left hearing loss.   See ENT ROS intake form for detailed history.    RESULTS:    Tympanograms were consistent with normal pressure and compliance in both ears.       Pure tone air conduction thresholds were consistent with normal hearing sensitivity 250-8000 Hz, bilaterally.       SRT:  Right: 10 dB HL            Left :  5 dB HL    WRS:  Right: 100% at 55 dB HL             Left: 100% at 55 dB HL    RECOMMENDATIONS:  1.  Follow-up with Paul Saenz MD.          Addended by Nell Cook, AUD on 2/17/2025  3:49 PM               Impression:       ICD-10-CM    1. Dysfunction of left eustachian tube  H69.92 Audiology Referral - In Network      2. Malar rash  R21            Recommendations:  I do think that the patient is having intermittent eustachian tube dysfunction.  It is also possible that there could be a component of TMJ arthralgia although this is less likely.  I would like her to obtain allergy testing.  I have prescribed her some prednisone as well as Flonase and azelastine but I would like her to start this after her allergy testing.  She will return to see me in 4 weeks for reevaluation.    Thank you for allowing me to participate in the care of your patient.    Bull Villagran DO    Otolaryngology/Rhinology, Sinus, and Endoscopic Skull Base Surgery  [address] [place] [id number]  Phone [phone]  Fax [phone]  2/17/2025  8:17 PM  2/17/2025          [1]   Allergies  Allergen Reactions    Other ANGIOEDEMA    Penicillins SWELLING and ANGIOEDEMA    Shellfish-Derived Products OTHER (SEE COMMENTS) and UNKNOWN

## 2025-02-24 ENCOUNTER — OFFICE VISIT (OUTPATIENT)
Dept: ALLERGY | Facility: CLINIC | Age: 35
End: 2025-02-24
Payer: COMMERCIAL

## 2025-02-24 ENCOUNTER — NURSE ONLY (OUTPATIENT)
Dept: ALLERGY | Facility: CLINIC | Age: 35
End: 2025-02-24

## 2025-02-24 DIAGNOSIS — H10.10 SEASONAL AND PERENNIAL ALLERGIC RHINOCONJUNCTIVITIS: Primary | ICD-10-CM

## 2025-02-24 DIAGNOSIS — J30.89 SEASONAL AND PERENNIAL ALLERGIC RHINOCONJUNCTIVITIS: Primary | ICD-10-CM

## 2025-02-24 DIAGNOSIS — Z23 NEED FOR COVID-19 VACCINE: ICD-10-CM

## 2025-02-24 DIAGNOSIS — J30.2 SEASONAL AND PERENNIAL ALLERGIC RHINOCONJUNCTIVITIS: Primary | ICD-10-CM

## 2025-02-24 DIAGNOSIS — Z23 FLU VACCINE NEED: ICD-10-CM

## 2025-02-24 DIAGNOSIS — L20.89 OTHER ATOPIC DERMATITIS: ICD-10-CM

## 2025-02-24 DIAGNOSIS — Z91.018 FOOD ALLERGY: ICD-10-CM

## 2025-02-24 DIAGNOSIS — Z88.0 PENICILLIN ALLERGY: ICD-10-CM

## 2025-02-24 PROCEDURE — 99204 OFFICE O/P NEW MOD 45 MIN: CPT | Performed by: ALLERGY & IMMUNOLOGY

## 2025-02-24 PROCEDURE — 95004 PERQ TESTS W/ALRGNC XTRCS: CPT | Performed by: ALLERGY & IMMUNOLOGY

## 2025-02-24 RX ORDER — TRIAMCINOLONE ACETONIDE 1 MG/G
OINTMENT TOPICAL
Qty: 453 G | Refills: 0 | Status: SHIPPED | OUTPATIENT
Start: 2025-02-24

## 2025-02-24 RX ORDER — LEVOCETIRIZINE DIHYDROCHLORIDE 5 MG/1
5 TABLET, FILM COATED ORAL EVERY EVENING
Qty: 90 TABLET | Refills: 1 | Status: SHIPPED | OUTPATIENT
Start: 2025-02-24

## 2025-02-24 RX ORDER — EPINEPHRINE 0.3 MG/.3ML
INJECTION SUBCUTANEOUS
Qty: 1 EACH | Refills: 0 | Status: SHIPPED | OUTPATIENT
Start: 2025-02-24

## 2025-02-24 NOTE — PROGRESS NOTES
Danisha Wood is a 34 year old female.    HPI:     Chief Complaint   Patient presents with    Allergies     Referred by ENT. Symptoms include runny nose, sneezing, earache, etc. Symptoms have been going on since December. No antihistamines within the last 5 days.     Food Allergy     Avoiding shellfish. Allergy since she was a child.      Patient is a 34-year-old female who presents for allergy consultation upon referral of her PCP with a chief complaint of allergies  Prior notes visit with PCP notes history of runny nose concern for allergic triggers.  Reviewed records also notes a history of penicillin and shellfish allergy  Medication list include EpiPen    Immunizations reviewed.  No vaccines on record    Today patient reports  History of Present Illness  Danisha Wood is a 34 year old female who presents with persistent nasal and ear symptoms. She was referred by Dr. Villagran for evaluation of her persistent nasal and ear symptoms.    She experiences persistent nasal symptoms, including a constantly leaking left nostril with fluid running both out of her nose and down her throat. Fluid in her ears leads to intermittent hearing loss. These symptoms have worsened significantly since December 2024, although she has had year-round allergies for a long time. No other allergic tendencies beyond eczema and asthma.    She has tried over-the-counter nasal sprays like Nasacort and Flonase, but they caused her heart to race. She uses allergy eye drops for itchy eyes, primarily in the summer, and takes Benadryl frequently for sneezing. She reports nasal congestion, itchy and watery eyes, and sneezing.    She has a known shellfish allergy, discovered at age 18, and carries an EpiPen, which she needs a refill for. She also reported a reaction to penicillin in her early twenties, which caused significant lip swelling. She has not consumed shellfish since her initial reaction and is cautious about penicillin use.    She  has a history of eczema, primarily on her hands, which presents as red, inflamed, and itchy skin. She uses triamcinolone cream as needed, which provides relief. She also has asthma and uses an inhaler for management.         HISTORY:  Past Medical History:    Anxiety    PCOS (polycystic ovarian syndrome)      Past Surgical History:   Procedure Laterality Date    Colonoscopy      Tonsillectomy        Family History   Problem Relation Age of Onset    High Blood Pressure Father     Cancer Mother 60        Thyroid Cancer    Ovarian Cancer Maternal Aunt 45    Melanoma Maternal Uncle     Breast Cancer Neg     Colon Cancer Neg       Social History:   Social History     Socioeconomic History    Marital status: Single   Occupational History    Occupation: [employer]     Comment: [employer]   Tobacco Use    Smoking status: Never    Smokeless tobacco: Never   Vaping Use    Vaping status: Never Used   Substance and Sexual Activity    Alcohol use: Not Currently     Comment: Socially     Drug use: Yes     Types: Cannabis     Comment: Socially     Sexual activity: Yes     Partners: Male     Birth control/protection: Pill   Other Topics Concern    Blood Transfusions No    Reaction to local anesthetic No    Pt has a pacemaker No    Pt has a defibrillator No   Social History Narrative    Live with BF    Feels safe.      Social Drivers of Health      Received from [facility]    Housing Stability        Medications (Active prior to today's visit):  Current Outpatient Medications   Medication Sig Dispense Refill    EPINEPHrine (EPIPEN 2-SANCHO) 0.3 MG/0.3ML Injection Solution Auto-injector Inject IM in event of  allergic reaction 1 each 0    triamcinolone 0.1 % External Ointment Applied 2 times per day as needed 453 g 0    levocetirizine 5 MG Oral Tab Take 1 tablet (5 mg total) by mouth every evening. 90 tablet 1    venlafaxine ER 37.5 MG Oral Capsule SR 24 Hr Take 1 capsule (37.5 mg total) by mouth daily.       Cyanocobalamin (B-12 OR) Take 1 tablet by mouth daily.      levonorgestrel (KYLEENA) 19.5 MG Intrauterine IUD 19,500 mcg (1 each total) by Intrauterine route one time.      ALPRAZolam 0.25 MG Oral Tab       triamcinolone 0.1 % External Cream APPLY TO AREA 2X DAY      fluticasone propionate 50 MCG/ACT Nasal Suspension 1 spray by Nasal route 2 (two) times daily. (Patient not taking: Reported on 2/24/2025) 16 g 3    azelastine 0.1 % Nasal Solution 1 spray by Nasal route 2 (two) times daily. (Patient not taking: Reported on 2/24/2025) 30 mL 3    EPINEPHrine 0.3 MG/0.3ML Injection Solution Auto-injector EpiPen 2-Saul 0.3 mg/0.3 mL injection, auto-injector   Take 1 auto as needed by injection route for 90 days. (Patient not taking: Reported on 2/24/2025)         Allergies:  Allergies[1]      ROS:     Allergic/Immuno:  See HPI  Cardiovascular:  Negative for irregular heartbeat/palpitations, chest pain, edema  Constitutional:  Negative night sweats,weight loss, irritability and lethargy  Endocrine:  Negative for cold intolerance, polydipsia and polyphagia  ENMT:  Negative for ear drainage, hearing loss and nasal drainage  Eyes:  Negative for eye discharge and vision loss  Gastrointestinal:  Negative for abdominal pain, diarrhea and vomiting  Genitourinary:  Negative for dysuria and hematuria  Hema/Lymph:  Negative for easy bleeding and easy bruising  Integumentary:  Negative for pruritus and rash  Musculoskeletal:  Negative for joint symptoms  Neurological:  Negative for dizziness, seizures  Psychiatric:  Negative for inappropriate interaction and psychiatric symptoms  Respiratory:  Negative for cough, dyspnea and wheezing      PHYSICAL EXAM:   Constitutional: responsive, no acute distress noted  Head/Face: NC/Atraumatic  Eyes/Vision: conjunctiva and lids are normal extraocular motion is intact   Ears/Audiometry: tympanic membranes are normal bilaterally hearing is grossly intact  Nose/Mouth/Throat: nose and throat are clear  mucous membranes are moist   Neck/Thyroid: neck is supple without adenopathy  Lymphatic: no abnormal cervical, supraclavicular or axillary adenopathy is noted  Respiratory: normal to inspection lungs are clear to auscultation bilaterally normal respiratory effort   Cardiovascular: regular rate and rhythm no murmurs, gallups, or rubs  Abdomen: soft non-tender non-distended  Skin/Hair: no unusual rashes present  Extremities: no edema, cyanosis, or clubbing  Neurological:Oriented to time, place, person & situation       ASSESSMENT/PLAN:   Assessment   Encounter Diagnoses   Name Primary?    Seasonal and perennial allergic rhinoconjunctivitis Yes    Food allergy     Flu vaccine need     Need for COVID-19 vaccine     Penicillin allergy     Other atopic dermatitis      Skin testing today to common indoor and outdoor environmental allergens was + to tree, grass cat dog, on spt. Pt deferred ID     Skin testing today to shellfish panel was  negative   Positive histamine control  Assessment & Plan  Allergic Rhinitis  Chronic rhinorrhea, nasal congestion, pruritic and watery eyes, and sneezing. Symptoms exacerbated since 2024. Year-round allergies. Previous nasal steroid sprays (Nasacort, Flonase) caused tachycardia. Currently using Benadryl and allergy eye drops. ENT prescribed Flonase and Astelin. Discussed alternative antihistamines (Xyzal, Claritin, Allegra, Zyrtec) and antihistamine nasal sprays. Patient prefers non-steroidal options due to previous side effects.  - Trial Astelin nasal spray, 2 sprays per nostril up to twice daily  - Consider Xyzal (levocetirizine) 5 mg once daily as a non-sedating antihistamine  - Consider Singulair if symptoms persist  - Perform skin testing for indoor and outdoor allergens    Food Allergies  Shellfish allergy with anaphylaxis. EpiPen . Discussed potential for outgrowing shellfish allergy and oral challenge if skin test is negative. Explained that 80% of individuals  outgrow food allergies within 10 years. Discussed risks and benefits of oral challenge in a controlled environment.  - Renew EpiPen prescription  - Perform skin testing for shellfish allergy  - If skin test is positive, continue to avoid shellfish  - If skin test is negative, consider oral challenge in a controlled environment    Penicillin Allergy  Lip swelling with penicillin in early twenties. Discussed potential for outgrowing the allergy and oral challenge if testing is negative. Explained that 80% of individuals outgrow penicillin allergy within 10 years. Discussed risks and benefits of oral challenge in a controlled environment.  - Check serum IgE to penicillin V and penicillin G  - Call with results  - If testing is negative, consider oral challenge to penicillin in office    Atopic Dermatitis  Eczema on hands, erythematous, inflamed, and pruritic. Currently using triamcinolone and moisturizers. Patient is satisfied with current control. Discussed continued use of moisturizers and triamcinolone as needed. Refill requested for triamcinolone in a larger quantity.  - Continue with moisturizers twice daily  - Use triamcinolone twice daily based on erythema and inflammation  - Refill triamcinolone as a 453-gram jar of ointment    General Health Maintenance  Discussed flu vaccine and COVID booster. Patient declined flu vaccine. Recommended checking with local pharmacy for COVID vaccine booster.  - Recommend flu vaccine  - Recommend COVID vaccine booster  - Check with local pharmacy for COVID vaccine.            Orders This Visit:  Orders Placed This Encounter   Procedures    Penicillin V    Penicillin G       Meds This Visit:  Requested Prescriptions     Signed Prescriptions Disp Refills    EPINEPHrine (EPIPEN 2-SANCHO) 0.3 MG/0.3ML Injection Solution Auto-injector 1 each 0     Sig: Inject IM in event of  allergic reaction    triamcinolone 0.1 % External Ointment 453 g 0     Sig: Applied 2 times per day as needed     levocetirizine 5 MG Oral Tab 90 tablet 1     Sig: Take 1 tablet (5 mg total) by mouth every evening.       Imaging & Referrals:  None     2/24/2025  Jose Quiroz MD      If medication samples were provided today, they were provided solely for patient education and training related to self administration of these medications.  Teaching, instruction and sample was provided to the patient by myself.  Teaching included  a review of potential adverse side effects as well as potential efficacy.  Patient's questions were answered in regards to medication administration and dosing and potential side effects. Teaching was provided via the teach back method         [1]   Allergies  Allergen Reactions    Other ANGIOEDEMA    Penicillins SWELLING and ANGIOEDEMA    Shellfish-Derived Products OTHER (SEE COMMENTS) and UNKNOWN

## 2025-02-24 NOTE — PROGRESS NOTES
The following individual(s) verbally consented to be recorded using ambient AI listening technology and understand that they can each withdraw their consent to this listening technology at any point by asking the clinician to turn off or pause the recording:    Patient name: Danisha Carpioon

## 2025-02-24 NOTE — PATIENT INSTRUCTIONS
Allergic Rhinitis  Chronic rhinorrhea, nasal congestion, pruritic and watery eyes, and sneezing. Symptoms exacerbated since 2024. Year-round allergies. Previous nasal steroid sprays (Nasacort, Flonase) caused tachycardia. Currently using Benadryl and allergy eye drops. ENT prescribed Flonase and Astelin. Discussed alternative antihistamines (Xyzal, Claritin, Allegra, Zyrtec) and antihistamine nasal sprays. Patient prefers non-steroidal options due to previous side effects.  - Trial Astelin nasal spray, 2 sprays per nostril up to twice daily  - Consider Xyzal (levocetirizine) 5 mg once daily as a non-sedating antihistamine  - Consider Singulair if symptoms persist  - Perform skin testing for indoor and outdoor allergens    Food Allergies  Shellfish allergy with anaphylaxis. EpiPen . Discussed potential for outgrowing shellfish allergy and oral challenge if skin test is negative. Explained that 80% of individuals outgrow food allergies within 10 years. Discussed risks and benefits of oral challenge in a controlled environment.  - Renew EpiPen prescription  - Perform skin testing for shellfish allergy  - If skin test is positive, continue to avoid shellfish  - If skin test is negative, consider oral challenge in a controlled environment    Penicillin Allergy  Lip swelling with penicillin in early twenties. Discussed potential for outgrowing the allergy and oral challenge if testing is negative. Explained that 80% of individuals outgrow penicillin allergy within 10 years. Discussed risks and benefits of oral challenge in a controlled environment.  - Check serum IgE to penicillin V and penicillin G  - Call with results  - If testing is negative, consider oral challenge to penicillin in office    Atopic Dermatitis  Eczema on hands, erythematous, inflamed, and pruritic. Currently using triamcinolone and moisturizers. Patient is satisfied with current control. Discussed continued use of moisturizers and  triamcinolone as needed. Refill requested for triamcinolone in a larger quantity.  - Continue with moisturizers twice daily  - Use triamcinolone twice daily based on erythema and inflammation  - Refill triamcinolone as a 453-gram jar of ointment    General Health Maintenance  Discussed flu vaccine and COVID booster. Patient declined flu vaccine. Recommended checking with local pharmacy for COVID vaccine booster.  - Recommend flu vaccine  - Recommend COVID vaccine booster  - Check with local pharmacy for COVID vaccine.            Orders This Visit:  Orders Placed This Encounter   Procedures    Penicillin V    Penicillin G       Meds This Visit:  Requested Prescriptions     Signed Prescriptions Disp Refills    EPINEPHrine (EPIPEN 2-SANCHO) 0.3 MG/0.3ML Injection Solution Auto-injector 1 each 0     Sig: Inject IM in event of  allergic reaction    triamcinolone 0.1 % External Ointment 453 g 0     Sig: Applied 2 times per day as needed    levocetirizine 5 MG Oral Tab 90 tablet 1     Sig: Take 1 tablet (5 mg total) by mouth every evening.

## 2025-03-10 NOTE — TELEPHONE ENCOUNTER
GI Staff:     Please schedule: CLN with MAC.     Split dose golytely bowel prep ordered.    Diagnosis: history of colon adenomas, CRC surveillance     Medication adjustments: none.

## 2025-03-11 NOTE — TELEPHONE ENCOUNTER
Scheduled for: Colonoscopy 03453    Provider Name:   Madrigal    Date:  7/7/2025    Location:    [facility]    Sedation:  MAC    Time:  815 am (Patient made aware [facility] will call the day before with procedure/arrival time)    Prep:  Golytely    Meds/Allergies Reconciled?:  Physician reviewed     Diagnosis with codes:    History of adenomatous polyp of colon [Z86.0101]   Screen for colon cancer [Z12.11]     Was patient informed to call insurance with codes (Y/N):  Yes, I confirmed [employer] insurance with the patient.     Referral sent?:  N/A    EM or [facility] notified?:  I sent an electronic request to Endo Scheduling and received a confirmation today.      Medication Orders:  This patient verbally confirmed that she is not taking:    Iron, blood thinners, BP meds, and is not diabetic    No latex allergy, No PCN allergy and does not have a pacemaker     Misc Orders:    Hold Zepbound 7 days prior to the procedure       Further instructions given by staff:   I discussed the prep instructions with the patient which she verbally understood and is aware that I will send the instructions today via [employer].    Advised patient:    You will not be able to drive, operate machinery or make critical decisions the day of your procedure. Please make arrangements for transportation. You must have a  (age 18 or older) to accompany you, stay in the facility for the duration of your procedure and drive you home after the procedure.  You cannot use public transportation (Uber, Lyft, Taxi). The procedure involves sedation, and you will not be allowed to leave unaccompanied. Your procedure will not proceed forward if you're unable to confirm your  planned to escort you home.    Advised Patient:    [facility] requires payment of copay and any patient responsibility at the time of registration.   The [facility] requires copay and 50% of the patient responsibility at the time of service for all Esophagogastroduodenoscopy and diagnostic Colonoscopies.      They do offer payment plans and Care Credit options if unable to pay the full amount at the time of registration.     If you have any questions regarding your potential responsibility, please contact [facility] Insurance Department at [phone] option 1.    You may receive 4 bills related to your medical procedure:   [facility] (the facility)  The procedural physician  The anesthesiologist  The pathology lab (if applicable)

## 2025-05-28 ENCOUNTER — LAB ENCOUNTER (OUTPATIENT)
Dept: LAB | Facility: HOSPITAL | Age: 35
End: 2025-05-28
Attending: INTERNAL MEDICINE
Payer: COMMERCIAL

## 2025-05-28 ENCOUNTER — OFFICE VISIT (OUTPATIENT)
Age: 35
End: 2025-05-28
Payer: COMMERCIAL

## 2025-05-28 VITALS
BODY MASS INDEX: 27.31 KG/M2 | SYSTOLIC BLOOD PRESSURE: 97 MMHG | WEIGHT: 160 LBS | HEIGHT: 64 IN | DIASTOLIC BLOOD PRESSURE: 65 MMHG

## 2025-05-28 DIAGNOSIS — R21 RASH: Primary | ICD-10-CM

## 2025-05-28 DIAGNOSIS — I73.00 RAYNAUD'S PHENOMENON WITHOUT GANGRENE: ICD-10-CM

## 2025-05-28 LAB
CRP SERPL-MCNC: <0.4 MG/DL (ref ?–1)
ERYTHROCYTE [SEDIMENTATION RATE] IN BLOOD: 10 MM/HR (ref 0–20)
RHEUMATOID FACT SERPL-ACNC: 6.9 IU/ML (ref ?–14)

## 2025-05-28 PROCEDURE — 99204 OFFICE O/P NEW MOD 45 MIN: CPT | Performed by: INTERNAL MEDICINE

## 2025-05-28 PROCEDURE — 86039 ANTINUCLEAR ANTIBODIES (ANA): CPT | Performed by: INTERNAL MEDICINE

## 2025-05-28 PROCEDURE — 85652 RBC SED RATE AUTOMATED: CPT | Performed by: INTERNAL MEDICINE

## 2025-05-28 PROCEDURE — 36415 COLL VENOUS BLD VENIPUNCTURE: CPT | Performed by: INTERNAL MEDICINE

## 2025-05-28 PROCEDURE — 86200 CCP ANTIBODY: CPT | Performed by: INTERNAL MEDICINE

## 2025-05-28 PROCEDURE — 86431 RHEUMATOID FACTOR QUANT: CPT | Performed by: INTERNAL MEDICINE

## 2025-05-28 PROCEDURE — 86140 C-REACTIVE PROTEIN: CPT | Performed by: INTERNAL MEDICINE

## 2025-05-28 PROCEDURE — 86235 NUCLEAR ANTIGEN ANTIBODY: CPT | Performed by: INTERNAL MEDICINE

## 2025-05-28 PROCEDURE — 86225 DNA ANTIBODY NATIVE: CPT | Performed by: INTERNAL MEDICINE

## 2025-05-28 PROCEDURE — 86038 ANTINUCLEAR ANTIBODIES: CPT | Performed by: INTERNAL MEDICINE

## 2025-05-28 NOTE — PROGRESS NOTES
Danisha Wood is a 34 year old female who presents for   Chief Complaint   Patient presents with    Consult    Rash     Pt has rash on face and fingers,fingers turn white when cold    .   HPI:   CC: rash, joint pain  Consult: referred by PCP Dr. Thomas Grubbs     This is a 33 yo F with hx of GERD, Asthma, Migraine's referred for a rash and joint pain.  She has noticed some erythema on her face for the past few years.  She was initially seen by dermatology and given tretinoin and it went away but then keeps coming back.  Rash can last about 6 weeks.  Also reports fatigue and brain fog.  She reports a history of Raynaud's involving her right hand.  She also reports dry mouth. No hx of oral ulcers, hair loss, lymphadenopathy, serositis, DVT/PE, miscarriages, No chest pain or sob.   He has intermittent joint pain and body aches.  Right hand can be painful.  No swelling.  No history of psoriasis.  She also has lower back pain.  She tries to stay active and do yoga.  She does not take any pain medications.  She works full-time as a   She also does yoga 5 days a week, lost about 50 pounds since August 2024      Wt Readings from Last 2 Encounters:   02/17/25 170 lb (77.1 kg)   10/15/24 197 lb (89.4 kg)     There is no height or weight on file to calculate BMI.      Current Medications[1]   Past Medical History[2]   Past Surgical History[3]   Family History[4]   Social History:  Short Social Hx on File[5]        REVIEW OF SYSTEMS:   Review Of Systems:  Constitutional: No fever, no change in weight or appetitie  Derm: + rashes, no oral ulcers, no alopecia, no photosensitivity, no psoriasis  HEENT: No dry eyes, + dry mouth, no Raynaud's, no nasal ulcers, no parotid swelling, no neck pain, no jaw pain, no temple pain  Eyes: No visual changes,   CVS: No chest pain, no heart disease  RS: No SOB, no Cough, No Pleurtic pain,   GI: No nausea, no vomiiting, no abominal pain, no hx of ulcer, no gastritis, no  heartburn, no dyshpagia, no BRBPR or melena  : no dysuria, no hx of miscarriages, no DVT Hx, no hx of OCP,   Neuro: No numbness or tingling, no headache, no hx of seizures,   Psych: no hx of anxiety or depression  ENDO: no hx of thyroid disease, no hx of DM  Joint/Muscluskeltal: see HPI,   All other ROS are negative.     EXAM:   LMP 10/04/2024 (Exact Date)   GEN: AAOx3, NAD  HEENT: EOMI, PERRLA, no injection or icterus, oral mucosa moist, no oral lesions. No lymphadenopathy. No facial rash  CVS: RRR, no murmurs rubs or gallops. Equal 2+ distal pulses.   LUNGS: CTAB, no increased work of breathing  ABDOMEN:  soft NT/ND, +BS, no HSM  SKIN: Cheeks with some mild erythema, dryness  MSK:  No swelling or synovitis on exam  Full range of motion in all joint  NEURO: Cranial nerves II-XII intact grossly. 5/5 strength throughout in both upper and lower extremities, sensation intact.  PSYCH: normal mood    LABS:     Reviewed    IMAGING:     None    ASSESSMENT AND PLAN:     Rash  Polyarthralgia    - She reports an intermittent rash on her face starting about 1 year ago.  She was seen by dermatology and given tretinoin but rash continues to come back  - She also reports symptoms of bodyaches, fatigue, intermittent joint pain.  She has no swelling no synovitis on exam.  - Will obtain further blood work for an autoimmune process but suspicion is low    Thank you for allowing me to participate in this patients care. Pt will be notified of results and if follow-up is needed    Vee Guerra MD  5/28/2025  1:48 PM         [1]   Current Outpatient Medications   Medication Sig Dispense Refill    polyethylene glycol, PEG 3350-KCl-NaBcb-NaCl-NaSulf, 236 g Oral Recon Soln Take 4,000 mL by mouth As Directed. As directed by GI clinic instructions. 4000 mL 0    EPINEPHrine (EPIPEN 2-SANCHO) 0.3 MG/0.3ML Injection Solution Auto-injector Inject IM in event of  allergic reaction 1 each 0    triamcinolone 0.1 % External Ointment Applied 2 times  per day as needed 453 g 0    levocetirizine 5 MG Oral Tab Take 1 tablet (5 mg total) by mouth every evening. 90 tablet 1    venlafaxine ER 37.5 MG Oral Capsule SR 24 Hr Take 1 capsule (37.5 mg total) by mouth daily.      fluticasone propionate 50 MCG/ACT Nasal Suspension 1 spray by Nasal route 2 (two) times daily. (Patient not taking: Reported on 2/24/2025) 16 g 3    azelastine 0.1 % Nasal Solution 1 spray by Nasal route 2 (two) times daily. (Patient not taking: Reported on 2/24/2025) 30 mL 3    Cyanocobalamin (B-12 OR) Take 1 tablet by mouth daily.      levonorgestrel (KYLEENA) 19.5 MG Intrauterine IUD 19,500 mcg (1 each total) by Intrauterine route one time.      ALPRAZolam 0.25 MG Oral Tab       triamcinolone 0.1 % External Cream APPLY TO AREA 2X DAY      EPINEPHrine 0.3 MG/0.3ML Injection Solution Auto-injector EpiPen 2-Saul 0.3 mg/0.3 mL injection, auto-injector   Take 1 auto as needed by injection route for 90 days. (Patient not taking: Reported on 2/24/2025)     [2]   Past Medical History:   Anxiety    PCOS (polycystic ovarian syndrome)   [3]   Past Surgical History:  Procedure Laterality Date    Colonoscopy      Tonsillectomy     [4]   Family History  Problem Relation Age of Onset    High Blood Pressure Father     Cancer Mother 60        Thyroid Cancer    Ovarian Cancer Maternal Aunt 45    Melanoma Maternal Uncle     Breast Cancer Neg     Colon Cancer Neg    [5]   Social History  Socioeconomic History    Marital status: Single   Occupational History    Occupation: Sales     Comment: [employer]   Tobacco Use    Smoking status: Never    Smokeless tobacco: Never   Vaping Use    Vaping status: Never Used   Substance and Sexual Activity    Alcohol use: Not Currently     Comment: Socially     Drug use: Yes     Types: Cannabis     Comment: Socially     Sexual activity: Yes     Partners: Male     Birth control/protection: Pill   Other Topics Concern    Blood Transfusions No    Reaction to local anesthetic No     Pt has a pacemaker No    Pt has a defibrillator No   Social History Narrative    Live with BF    Feels safe.      Social Drivers of Health      Received from [facility]    Housing Stability

## 2025-05-28 NOTE — PATIENT INSTRUCTIONS
You were seen today for a rash, Raynaud's where your fingers turn white in the cold  He also some mild joint pain  Lets evaluate to see if anything autoimmune is going on  Plan to get blood work today

## 2025-05-30 LAB
ANA NUCLEOLAR TITR SER IF: 2560 {TITER}
CCP IGG SERPL-ACNC: <0.4 U/ML (ref 0–6.9)
DSDNA AB TITR SER: <10 {TITER}
NUCLEAR IGG TITR SER IF: POSITIVE {TITER}

## 2025-06-02 LAB
CENTROMERE IGG SER-ACNC: 0.5 U/ML (ref ?–7)
ENA JO1 AB SER IA-ACNC: 0.3 U/ML (ref ?–7)
ENA RNP IGG SER IA-ACNC: 1.4 U/ML (ref ?–7)
ENA SCL70 IGG SER IA-ACNC: 1 U/ML (ref ?–7)
ENA SM IGG SER IA-ACNC: 0.8 U/ML (ref ?–7)
ENA SS-A IGG SER IA-ACNC: 0.5 U/ML (ref ?–7)
ENA SS-B IGG SER IA-ACNC: 0.4 U/ML (ref ?–7)
U1 SNRNP IGG SER IA-ACNC: 2.4 U/ML (ref ?–5)

## 2025-06-27 ENCOUNTER — TELEPHONE (OUTPATIENT)
Facility: CLINIC | Age: 35
End: 2025-06-27

## 2025-06-27 NOTE — TELEPHONE ENCOUNTER
Patient was called to confirm upcoming procedure.  Confirmed location, date, medications, prep  and insurance. Patient inform me that her insurance has changed to [employer] is waiting to hear from her insurance if she is cover to have procedure at [place]  She willing calling back to let us know.    Patient is aware to hold  .ZEPBOUND FOR 1 WEEK PRIOR TO PROCEDURE   Patient had prep questions, which were addressed appropriately.Wants a different prep to be called to pharmacy    Patient verbally understood.

## 2025-06-28 ENCOUNTER — TELEPHONE (OUTPATIENT)
Dept: ALLERGY | Facility: CLINIC | Age: 35
End: 2025-06-28

## 2025-06-28 NOTE — TELEPHONE ENCOUNTER
Labs from 02/24/2025 have not been completed.   Letter sent home.   Postponed x 2 months.     Dr. Quiroz, if labs have not been completed in that time okay to cancel?

## (undated) NOTE — LETTER
IMMEDIATE CARE [facility] [address]  [address]  [phone]     Patient: Garima Benitez   YOB: 1990   Date of Visit: 11/23/2020     Dear Employer,        November 23, 2020    At [facility], we are taking special Persons infected with SARS-CoV-2 who never develop COVID-19 symptoms may discontinue isolation and other precautions 10 days after the date of their first positive RT-PCR test for SARS-CoV-2 RNA.     Persons who are asymptomatic but have been exposed, CDC r

## (undated) NOTE — LETTER
Date & Time: 10/23/2024, 10:27 AM  Patient: Danisha Wood  Encounter Provider(s):    Kayla Esposito APRN       To Whom It May Concern:    Danisha Wood was seen and treated in our department on 10/23/2024. She can return to work on 10/26/2024.    If you have any questions or concerns, please do not hesitate to call.      RITU Juárez  Physician/APC Signature

## (undated) NOTE — LETTER
6/28/2025          Danisha Wood    [address] [place] [address] [address] [place] [id number]         Dear Danisha,    Our records indicate that the tests ordered for you by Jose Quiroz MD  have not been done.  If you have, in fact, already completed the tests or you do not wish to have the tests done, please contact our office at THE NUMBER LISTED BELOW.  Otherwise, please proceed with the testing.          Sincerely,    Jose Quiroz MD  [address] [id number] [phone]